# Patient Record
Sex: FEMALE | Race: WHITE | NOT HISPANIC OR LATINO | URBAN - METROPOLITAN AREA
[De-identification: names, ages, dates, MRNs, and addresses within clinical notes are randomized per-mention and may not be internally consistent; named-entity substitution may affect disease eponyms.]

---

## 2021-08-13 ENCOUNTER — INPATIENT (INPATIENT)
Facility: HOSPITAL | Age: 61
LOS: 1 days | Discharge: HOME | End: 2021-08-15
Attending: STUDENT IN AN ORGANIZED HEALTH CARE EDUCATION/TRAINING PROGRAM | Admitting: STUDENT IN AN ORGANIZED HEALTH CARE EDUCATION/TRAINING PROGRAM
Payer: COMMERCIAL

## 2021-08-13 VITALS
WEIGHT: 164.91 LBS | HEART RATE: 80 BPM | TEMPERATURE: 100 F | OXYGEN SATURATION: 99 % | HEIGHT: 61 IN | SYSTOLIC BLOOD PRESSURE: 149 MMHG | DIASTOLIC BLOOD PRESSURE: 83 MMHG | RESPIRATION RATE: 18 BRPM

## 2021-08-13 DIAGNOSIS — Z98.890 OTHER SPECIFIED POSTPROCEDURAL STATES: Chronic | ICD-10-CM

## 2021-08-13 DIAGNOSIS — E89.0 POSTPROCEDURAL HYPOTHYROIDISM: Chronic | ICD-10-CM

## 2021-08-13 DIAGNOSIS — K35.80 UNSPECIFIED ACUTE APPENDICITIS: ICD-10-CM

## 2021-08-13 DIAGNOSIS — K21.9 GASTRO-ESOPHAGEAL REFLUX DISEASE WITHOUT ESOPHAGITIS: ICD-10-CM

## 2021-08-13 LAB
ALBUMIN SERPL ELPH-MCNC: 4.9 G/DL — SIGNIFICANT CHANGE UP (ref 3.5–5.2)
ALP SERPL-CCNC: 146 U/L — HIGH (ref 30–115)
ALT FLD-CCNC: 28 U/L — SIGNIFICANT CHANGE UP (ref 0–41)
ANION GAP SERPL CALC-SCNC: 13 MMOL/L — SIGNIFICANT CHANGE UP (ref 7–14)
APPEARANCE UR: CLEAR — SIGNIFICANT CHANGE UP
APTT BLD: 33.2 SEC — SIGNIFICANT CHANGE UP (ref 27–39.2)
AST SERPL-CCNC: 14 U/L — SIGNIFICANT CHANGE UP (ref 0–41)
BACTERIA # UR AUTO: ABNORMAL
BASOPHILS # BLD AUTO: 0.03 K/UL — SIGNIFICANT CHANGE UP (ref 0–0.2)
BASOPHILS NFR BLD AUTO: 0.3 % — SIGNIFICANT CHANGE UP (ref 0–1)
BILIRUB DIRECT SERPL-MCNC: <0.2 MG/DL — SIGNIFICANT CHANGE UP (ref 0–0.2)
BILIRUB INDIRECT FLD-MCNC: >0.3 MG/DL — SIGNIFICANT CHANGE UP (ref 0.2–1.2)
BILIRUB SERPL-MCNC: 0.5 MG/DL — SIGNIFICANT CHANGE UP (ref 0.2–1.2)
BILIRUB UR-MCNC: NEGATIVE — SIGNIFICANT CHANGE UP
BLD GP AB SCN SERPL QL: SIGNIFICANT CHANGE UP
BUN SERPL-MCNC: 13 MG/DL — SIGNIFICANT CHANGE UP (ref 10–20)
CALCIUM SERPL-MCNC: 10 MG/DL — SIGNIFICANT CHANGE UP (ref 8.5–10.1)
CHLORIDE SERPL-SCNC: 101 MMOL/L — SIGNIFICANT CHANGE UP (ref 98–110)
CO2 SERPL-SCNC: 25 MMOL/L — SIGNIFICANT CHANGE UP (ref 17–32)
COLOR SPEC: YELLOW — SIGNIFICANT CHANGE UP
COMMENT - URINE: SIGNIFICANT CHANGE UP
CREAT SERPL-MCNC: 0.7 MG/DL — SIGNIFICANT CHANGE UP (ref 0.7–1.5)
DIFF PNL FLD: ABNORMAL
EOSINOPHIL # BLD AUTO: 0.03 K/UL — SIGNIFICANT CHANGE UP (ref 0–0.7)
EOSINOPHIL NFR BLD AUTO: 0.3 % — SIGNIFICANT CHANGE UP (ref 0–8)
EPI CELLS # UR: ABNORMAL /HPF
GLUCOSE SERPL-MCNC: 116 MG/DL — HIGH (ref 70–99)
GLUCOSE UR QL: NEGATIVE MG/DL — SIGNIFICANT CHANGE UP
HCT VFR BLD CALC: 39.6 % — SIGNIFICANT CHANGE UP (ref 37–47)
HGB BLD-MCNC: 13.4 G/DL — SIGNIFICANT CHANGE UP (ref 12–16)
IMM GRANULOCYTES NFR BLD AUTO: 0.3 % — SIGNIFICANT CHANGE UP (ref 0.1–0.3)
INR BLD: 1.08 RATIO — SIGNIFICANT CHANGE UP (ref 0.65–1.3)
KETONES UR-MCNC: NEGATIVE — SIGNIFICANT CHANGE UP
LACTATE SERPL-SCNC: 1.7 MMOL/L — SIGNIFICANT CHANGE UP (ref 0.7–2)
LEUKOCYTE ESTERASE UR-ACNC: NEGATIVE — SIGNIFICANT CHANGE UP
LIDOCAIN IGE QN: 35 U/L — SIGNIFICANT CHANGE UP (ref 7–60)
LYMPHOCYTES # BLD AUTO: 1.23 K/UL — SIGNIFICANT CHANGE UP (ref 1.2–3.4)
LYMPHOCYTES # BLD AUTO: 12.1 % — LOW (ref 20.5–51.1)
MCHC RBC-ENTMCNC: 33.1 PG — HIGH (ref 27–31)
MCHC RBC-ENTMCNC: 33.8 G/DL — SIGNIFICANT CHANGE UP (ref 32–37)
MCV RBC AUTO: 97.8 FL — SIGNIFICANT CHANGE UP (ref 81–99)
MONOCYTES # BLD AUTO: 0.5 K/UL — SIGNIFICANT CHANGE UP (ref 0.1–0.6)
MONOCYTES NFR BLD AUTO: 4.9 % — SIGNIFICANT CHANGE UP (ref 1.7–9.3)
NEUTROPHILS # BLD AUTO: 8.33 K/UL — HIGH (ref 1.4–6.5)
NEUTROPHILS NFR BLD AUTO: 82.1 % — HIGH (ref 42.2–75.2)
NITRITE UR-MCNC: NEGATIVE — SIGNIFICANT CHANGE UP
NRBC # BLD: 0 /100 WBCS — SIGNIFICANT CHANGE UP (ref 0–0)
PH UR: 6 — SIGNIFICANT CHANGE UP (ref 5–8)
PLATELET # BLD AUTO: 273 K/UL — SIGNIFICANT CHANGE UP (ref 130–400)
POTASSIUM SERPL-MCNC: 3.9 MMOL/L — SIGNIFICANT CHANGE UP (ref 3.5–5)
POTASSIUM SERPL-SCNC: 3.9 MMOL/L — SIGNIFICANT CHANGE UP (ref 3.5–5)
PROT SERPL-MCNC: 7.2 G/DL — SIGNIFICANT CHANGE UP (ref 6–8)
PROT UR-MCNC: NEGATIVE MG/DL — SIGNIFICANT CHANGE UP
PROTHROM AB SERPL-ACNC: 12.4 SEC — SIGNIFICANT CHANGE UP (ref 9.95–12.87)
RBC # BLD: 4.05 M/UL — LOW (ref 4.2–5.4)
RBC # FLD: 12.5 % — SIGNIFICANT CHANGE UP (ref 11.5–14.5)
RBC CASTS # UR COMP ASSIST: ABNORMAL /HPF
SARS-COV-2 RNA SPEC QL NAA+PROBE: SIGNIFICANT CHANGE UP
SODIUM SERPL-SCNC: 139 MMOL/L — SIGNIFICANT CHANGE UP (ref 135–146)
SP GR SPEC: 1.02 — SIGNIFICANT CHANGE UP (ref 1.01–1.03)
UROBILINOGEN FLD QL: 0.2 MG/DL — SIGNIFICANT CHANGE UP (ref 0.2–0.2)
WBC # BLD: 10.15 K/UL — SIGNIFICANT CHANGE UP (ref 4.8–10.8)
WBC # FLD AUTO: 10.15 K/UL — SIGNIFICANT CHANGE UP (ref 4.8–10.8)
WBC UR QL: SIGNIFICANT CHANGE UP /HPF

## 2021-08-13 PROCEDURE — 99285 EMERGENCY DEPT VISIT HI MDM: CPT

## 2021-08-13 PROCEDURE — 74177 CT ABD & PELVIS W/CONTRAST: CPT | Mod: 26,MA

## 2021-08-13 RX ORDER — CEFOTETAN DISODIUM 1 G
2 VIAL (EA) INJECTION ONCE
Refills: 0 | Status: COMPLETED | OUTPATIENT
Start: 2021-08-13 | End: 2021-08-13

## 2021-08-13 RX ORDER — CEFOTETAN DISODIUM 1 G
1 VIAL (EA) INJECTION EVERY 12 HOURS
Refills: 0 | Status: DISCONTINUED | OUTPATIENT
Start: 2021-08-14 | End: 2021-08-14

## 2021-08-13 RX ORDER — FAMOTIDINE 10 MG/ML
20 INJECTION INTRAVENOUS ONCE
Refills: 0 | Status: COMPLETED | OUTPATIENT
Start: 2021-08-13 | End: 2021-08-13

## 2021-08-13 RX ORDER — SODIUM CHLORIDE 9 MG/ML
1000 INJECTION INTRAMUSCULAR; INTRAVENOUS; SUBCUTANEOUS ONCE
Refills: 0 | Status: COMPLETED | OUTPATIENT
Start: 2021-08-13 | End: 2021-08-13

## 2021-08-13 RX ORDER — CEFOTETAN DISODIUM 1 G
VIAL (EA) INJECTION
Refills: 0 | Status: DISCONTINUED | OUTPATIENT
Start: 2021-08-14 | End: 2021-08-14

## 2021-08-13 RX ORDER — ONDANSETRON 8 MG/1
4 TABLET, FILM COATED ORAL ONCE
Refills: 0 | Status: COMPLETED | OUTPATIENT
Start: 2021-08-13 | End: 2021-08-13

## 2021-08-13 RX ORDER — CEFOTETAN DISODIUM 1 G
1 VIAL (EA) INJECTION ONCE
Refills: 0 | Status: COMPLETED | OUTPATIENT
Start: 2021-08-13 | End: 2021-08-13

## 2021-08-13 RX ADMIN — SODIUM CHLORIDE 1000 MILLILITER(S): 9 INJECTION INTRAMUSCULAR; INTRAVENOUS; SUBCUTANEOUS at 20:36

## 2021-08-13 RX ADMIN — Medication 30 MILLILITER(S): at 20:38

## 2021-08-13 RX ADMIN — ONDANSETRON 4 MILLIGRAM(S): 8 TABLET, FILM COATED ORAL at 20:38

## 2021-08-13 RX ADMIN — Medication 100 GRAM(S): at 23:09

## 2021-08-13 RX ADMIN — FAMOTIDINE 20 MILLIGRAM(S): 10 INJECTION INTRAVENOUS at 20:38

## 2021-08-13 NOTE — ED ADULT TRIAGE NOTE - CHIEF COMPLAINT QUOTE
pt states entire abdominal pain and pain was worse when palpated at urgent care in RLQ of abdomen, pain began at 3am

## 2021-08-13 NOTE — ED PROVIDER NOTE - OBJECTIVE STATEMENT
61 year old F with hx of htn, hld,  breast ca s/p rt lumpectomy x 10 years ago, thyroid ca s/p thyroidectomy in 2016 c/o abd pain. Sts since 3 am has had generalized burning abd pain with assoc nausea, decreased appetite. No fever/chills, chest pain, sob, diarrhea, urinary symptoms, recent travel, sick contact, abd surgeries, tobacco/alcohol use.

## 2021-08-13 NOTE — H&P ADULT - ATTENDING COMMENTS
62yo woman who p/w abdominal pain for a few days, now localized to the RLQ. There is tenderness in the RLQ and low grade temp to 100.2. Otherwise, pt HD stable. WBC ct notably normal. CT with acute appendecitis with fecalith. Plan for adfmission, IV abx, lap appendectomy. R/B/A were discussed with the pt and she provided informed consent.

## 2021-08-13 NOTE — ED PROVIDER NOTE - PHYSICAL EXAMINATION
CONSTITUTIONAL: Well-appearing; well-nourished; in no apparent distress.   EYES: PERRL; EOM intact.   ENT: normal nose; no rhinorrhea; normal pharynx with no tonsillar hypertrophy.   NECK: Supple; non-tender; no cervical lymphadenopathy.   CARDIOVASCULAR: Normal S1, S2; no murmurs, rubs, or gallops.   RESPIRATORY: Normal chest excursion with respiration; breath sounds clear and equal bilaterally; no wheezes, rhonchi, or rales.  GI/: Normal bowel sounds; non-distended; non-tender; no palpable organomegaly. no cva ttp. Neg murphs sign. No ttp at m[place] point.   MS: No evidence of trauma or deformity. Normal ROM in all four extremities; non-tender to palpation; distal pulses are normal.   SKIN: Normal for age and race; warm; dry; good turgor; no apparent lesions or exudate.   NEURO/PSYCH: A & O x 4; grossly unremarkable. mood and manner are appropriate.

## 2021-08-13 NOTE — H&P ADULT - NSICDXPASTMEDICALHX_GEN_ALL_CORE_FT
PAST MEDICAL HISTORY:  Cancer of breast 10 yr  ago      History of thyroid cancer     HLD (hyperlipidemia)     HTN (hypertension)

## 2021-08-13 NOTE — ED PROVIDER NOTE - NS ED ROS FT
Constitutional: no fever, chills, no recent weight loss, change in appetite or malaise  Eyes: no redness/discharge/pain/vision changes  ENT: no rhinorrhea/ear pain/sore throat  Cardiac: No chest pain, SOB or edema.  Respiratory: No cough or respiratory distress  GI: + abd pain/nausea  : No dysuria, frequency, urgency or hematuria  MS: no pain to back or extremities, no loss of ROM, no weakness  Neuro: No headache or weakness. No LOC.  Skin: No skin rash.  Endocrine: No history of thyroid disease or diabetes.  Except as documented in the HPI, all other systems are negative.

## 2021-08-13 NOTE — H&P ADULT - HISTORY OF PRESENT ILLNESS
Chief Complaint: abdominal pain.    · Chief Complaint: The patient is a 61y Female complaining of abdominal pain.  · HPI Objective Statement: 61 year old F with hx of htn, hld,  breast ca s/p rt lumpectomy/radiation  x 10 years ago, thyroid ca s/p thyroidectomy in 2016 c/o abd pain. Sts since 3 am has had generalized burning abd pain with assoc nausea, decreased appetite. No fever/chills, chest pain, sob, diarrhea, urinary symptoms, recent travel, sick contact, abd surgeries, tobacco/alcohol use.    HIV:    HIV Test Questions:  · In accordance with [place] State law, we offer every patient who comes to our ED an HIV test. Would you like to be tested today?	Opt out    PAST MEDICAL/SURGICAL/FAMILY/SOCIAL HISTORY:    Tobacco Usage:  · Tobacco Usage	Never smoker    ALLERGIES AND HOME MEDICATIONS:   Allergies:        Allergies:  	sulfa drugs: Drug Category, Rash    Home Medications:   * Outpatient Medication Status not yet specified

## 2021-08-13 NOTE — H&P ADULT - NSHPLABSRESULTS_GEN_ALL_CORE
EXAM:  CT ABDOMEN AND PELVIS IC            PROCEDURE DATE:  08/13/2021            INTERPRETATION:  CLINICAL HISTORY: Abdominal pain.    TECHNIQUE: Contiguous axial CT images were obtained from the lower chest to the pubic symphysis following administration of intravenous contrast. Oral contrast was not administered. Reformatted images in the coronal and sagittal planes were acquired.    COMPARISON: None.      FINDINGS:    LOWER CHEST: Unremarkable.    HEPATOBILIARY: Hepatic cysts. 1.2 cm focal enhancement in the right lobe of the liver (4/113)    SPLEEN: Unremarkable.    PANCREAS: Unremarkable.    ADRENAL GLANDS: Unremarkable.    KIDNEYS: No hydronephrosis bilaterally. Symmetric nephrograms. 4mm nonobstructing right renal calculus    ABDOMINOPELVIC NODES: No lymphadenopathy.    PELVIC ORGANS: Fibroid uterus. If clinically warranted, nonemergent pelvic ultrasound may be of benefit.    PERITONEUM/MESENTERY/BOWEL: Dilated appendix and proximal, tube-shaped appendicolith measuring up to 3.0cm in length. Adjacent mild inflammatory stranding. No abscess or abdominopelvic free fluid or gas.  No evidence of bowel obstruction.    BONES/SOFT TISSUES: No acute osseous abnormality. Lumbar discopathy predominantly involving L4-L5 disc space.. Densely sclerotic stellate-shaped lesion in the left ischial tuberosity measures up to 1.5 cm and is compatible with a bone island.    OTHER: Calcific atherosclerosis.      IMPRESSION:      Acute appendicitis with a proximal, tibial appendicolith measuring up to 3.0 cm in length. No abscess or free fluid or air.    --- End of Report ---            LARA JUAN MD; Resident Radiologist  This document has been electronically signed.  GOPAL STEINBEGR MD; Attending Radiologist  This document has been electronically signed. Aug 13 2021 10:40PM

## 2021-08-13 NOTE — H&P ADULT - ASSESSMENT
appendicitis    npo  ivf  ns  @ 125  iv abx  cefotetan  pain management   morphine prn  zofran   pepcid iv  dvt/ gi prophylaxis  scds

## 2021-08-14 ENCOUNTER — RESULT REVIEW (OUTPATIENT)
Age: 61
End: 2021-08-14

## 2021-08-14 ENCOUNTER — TRANSCRIPTION ENCOUNTER (OUTPATIENT)
Age: 61
End: 2021-08-14

## 2021-08-14 LAB
ANION GAP SERPL CALC-SCNC: 12 MMOL/L — SIGNIFICANT CHANGE UP (ref 7–14)
BUN SERPL-MCNC: 10 MG/DL — SIGNIFICANT CHANGE UP (ref 10–20)
CALCIUM SERPL-MCNC: 9.2 MG/DL — SIGNIFICANT CHANGE UP (ref 8.5–10.1)
CHLORIDE SERPL-SCNC: 106 MMOL/L — SIGNIFICANT CHANGE UP (ref 98–110)
CO2 SERPL-SCNC: 23 MMOL/L — SIGNIFICANT CHANGE UP (ref 17–32)
COVID-19 SPIKE DOMAIN AB INTERP: POSITIVE
COVID-19 SPIKE DOMAIN ANTIBODY RESULT: 197 U/ML — HIGH
CREAT SERPL-MCNC: 0.8 MG/DL — SIGNIFICANT CHANGE UP (ref 0.7–1.5)
GLUCOSE SERPL-MCNC: 98 MG/DL — SIGNIFICANT CHANGE UP (ref 70–99)
HCT VFR BLD CALC: 36.4 % — LOW (ref 37–47)
HCV AB S/CO SERPL IA: 0.03 COI — SIGNIFICANT CHANGE UP
HCV AB SERPL-IMP: SIGNIFICANT CHANGE UP
HGB BLD-MCNC: 12.3 G/DL — SIGNIFICANT CHANGE UP (ref 12–16)
MCHC RBC-ENTMCNC: 33.4 PG — HIGH (ref 27–31)
MCHC RBC-ENTMCNC: 33.8 G/DL — SIGNIFICANT CHANGE UP (ref 32–37)
MCV RBC AUTO: 98.9 FL — SIGNIFICANT CHANGE UP (ref 81–99)
NRBC # BLD: 0 /100 WBCS — SIGNIFICANT CHANGE UP (ref 0–0)
PLATELET # BLD AUTO: 255 K/UL — SIGNIFICANT CHANGE UP (ref 130–400)
POTASSIUM SERPL-MCNC: 3.9 MMOL/L — SIGNIFICANT CHANGE UP (ref 3.5–5)
POTASSIUM SERPL-SCNC: 3.9 MMOL/L — SIGNIFICANT CHANGE UP (ref 3.5–5)
RBC # BLD: 3.68 M/UL — LOW (ref 4.2–5.4)
RBC # FLD: 12.7 % — SIGNIFICANT CHANGE UP (ref 11.5–14.5)
SARS-COV-2 IGG+IGM SERPL QL IA: 197 U/ML — HIGH
SARS-COV-2 IGG+IGM SERPL QL IA: POSITIVE
SODIUM SERPL-SCNC: 141 MMOL/L — SIGNIFICANT CHANGE UP (ref 135–146)
WBC # BLD: 8.33 K/UL — SIGNIFICANT CHANGE UP (ref 4.8–10.8)
WBC # FLD AUTO: 8.33 K/UL — SIGNIFICANT CHANGE UP (ref 4.8–10.8)

## 2021-08-14 PROCEDURE — 93010 ELECTROCARDIOGRAM REPORT: CPT

## 2021-08-14 PROCEDURE — 99223 1ST HOSP IP/OBS HIGH 75: CPT | Mod: 57

## 2021-08-14 PROCEDURE — 88304 TISSUE EXAM BY PATHOLOGIST: CPT | Mod: 26

## 2021-08-14 PROCEDURE — 44970 LAPAROSCOPY APPENDECTOMY: CPT

## 2021-08-14 RX ORDER — OXYCODONE HYDROCHLORIDE 5 MG/1
5 TABLET ORAL EVERY 6 HOURS
Refills: 0 | Status: DISCONTINUED | OUTPATIENT
Start: 2021-08-14 | End: 2021-08-15

## 2021-08-14 RX ORDER — LEVOTHYROXINE SODIUM 125 MCG
1 TABLET ORAL
Qty: 0 | Refills: 0 | DISCHARGE

## 2021-08-14 RX ORDER — IBUPROFEN 200 MG
400 TABLET ORAL EVERY 6 HOURS
Refills: 0 | Status: DISCONTINUED | OUTPATIENT
Start: 2021-08-14 | End: 2021-08-15

## 2021-08-14 RX ORDER — HYDROMORPHONE HYDROCHLORIDE 2 MG/ML
1 INJECTION INTRAMUSCULAR; INTRAVENOUS; SUBCUTANEOUS
Refills: 0 | Status: DISCONTINUED | OUTPATIENT
Start: 2021-08-14 | End: 2021-08-14

## 2021-08-14 RX ORDER — LEVOTHYROXINE SODIUM 125 MCG
100 TABLET ORAL DAILY
Refills: 0 | Status: DISCONTINUED | OUTPATIENT
Start: 2021-08-15 | End: 2021-08-15

## 2021-08-14 RX ORDER — EXEMESTANE 25 MG/1
1 TABLET, SUGAR COATED ORAL
Qty: 0 | Refills: 0 | DISCHARGE

## 2021-08-14 RX ORDER — HYDROCHLOROTHIAZIDE 25 MG
12.5 TABLET ORAL DAILY
Refills: 0 | Status: DISCONTINUED | OUTPATIENT
Start: 2021-08-15 | End: 2021-08-15

## 2021-08-14 RX ORDER — ATORVASTATIN CALCIUM 80 MG/1
20 TABLET, FILM COATED ORAL AT BEDTIME
Refills: 0 | Status: DISCONTINUED | OUTPATIENT
Start: 2021-08-14 | End: 2021-08-15

## 2021-08-14 RX ORDER — LOSARTAN/HYDROCHLOROTHIAZIDE 100MG-25MG
1 TABLET ORAL
Qty: 0 | Refills: 0 | DISCHARGE

## 2021-08-14 RX ORDER — SODIUM CHLORIDE 9 MG/ML
1000 INJECTION, SOLUTION INTRAVENOUS
Refills: 0 | Status: DISCONTINUED | OUTPATIENT
Start: 2021-08-14 | End: 2021-08-14

## 2021-08-14 RX ORDER — IBUPROFEN 200 MG
1 TABLET ORAL
Qty: 0 | Refills: 0 | DISCHARGE
Start: 2021-08-14

## 2021-08-14 RX ORDER — FAMOTIDINE 10 MG/ML
20 INJECTION INTRAVENOUS EVERY 12 HOURS
Refills: 0 | Status: DISCONTINUED | OUTPATIENT
Start: 2021-08-14 | End: 2021-08-14

## 2021-08-14 RX ORDER — ONDANSETRON 8 MG/1
4 TABLET, FILM COATED ORAL EVERY 6 HOURS
Refills: 0 | Status: DISCONTINUED | OUTPATIENT
Start: 2021-08-13 | End: 2021-08-14

## 2021-08-14 RX ORDER — SODIUM CHLORIDE 9 MG/ML
1000 INJECTION INTRAMUSCULAR; INTRAVENOUS; SUBCUTANEOUS
Refills: 0 | Status: DISCONTINUED | OUTPATIENT
Start: 2021-08-13 | End: 2021-08-14

## 2021-08-14 RX ORDER — MORPHINE SULFATE 50 MG/1
2 CAPSULE, EXTENDED RELEASE ORAL EVERY 4 HOURS
Refills: 0 | Status: DISCONTINUED | OUTPATIENT
Start: 2021-08-13 | End: 2021-08-14

## 2021-08-14 RX ORDER — CEFOTETAN DISODIUM 1 G
1 VIAL (EA) INJECTION EVERY 12 HOURS
Refills: 0 | Status: DISCONTINUED | OUTPATIENT
Start: 2021-08-14 | End: 2021-08-15

## 2021-08-14 RX ORDER — MORPHINE SULFATE 50 MG/1
4 CAPSULE, EXTENDED RELEASE ORAL EVERY 6 HOURS
Refills: 0 | Status: DISCONTINUED | OUTPATIENT
Start: 2021-08-13 | End: 2021-08-14

## 2021-08-14 RX ORDER — HYDROMORPHONE HYDROCHLORIDE 2 MG/ML
0.5 INJECTION INTRAMUSCULAR; INTRAVENOUS; SUBCUTANEOUS
Refills: 0 | Status: DISCONTINUED | OUTPATIENT
Start: 2021-08-14 | End: 2021-08-14

## 2021-08-14 RX ORDER — METOPROLOL TARTRATE 50 MG
25 TABLET ORAL DAILY
Refills: 0 | Status: DISCONTINUED | OUTPATIENT
Start: 2021-08-15 | End: 2021-08-15

## 2021-08-14 RX ORDER — OMEPRAZOLE 10 MG/1
1 CAPSULE, DELAYED RELEASE ORAL
Qty: 0 | Refills: 0 | DISCHARGE

## 2021-08-14 RX ORDER — CEFOTETAN DISODIUM 1 G
1 VIAL (EA) INJECTION ONCE
Refills: 0 | Status: COMPLETED | OUTPATIENT
Start: 2021-08-14 | End: 2021-08-14

## 2021-08-14 RX ORDER — CEFOTETAN DISODIUM 1 G
1 VIAL (EA) INJECTION EVERY 12 HOURS
Refills: 0 | Status: DISCONTINUED | OUTPATIENT
Start: 2021-08-14 | End: 2021-08-14

## 2021-08-14 RX ORDER — LOSARTAN POTASSIUM 100 MG/1
100 TABLET, FILM COATED ORAL DAILY
Refills: 0 | Status: DISCONTINUED | OUTPATIENT
Start: 2021-08-15 | End: 2021-08-15

## 2021-08-14 RX ORDER — ACETAMINOPHEN 500 MG
2 TABLET ORAL
Qty: 0 | Refills: 0 | DISCHARGE
Start: 2021-08-14

## 2021-08-14 RX ORDER — PANTOPRAZOLE SODIUM 20 MG/1
40 TABLET, DELAYED RELEASE ORAL
Refills: 0 | Status: DISCONTINUED | OUTPATIENT
Start: 2021-08-14 | End: 2021-08-15

## 2021-08-14 RX ORDER — HEPARIN SODIUM 5000 [USP'U]/ML
5000 INJECTION INTRAVENOUS; SUBCUTANEOUS EVERY 8 HOURS
Refills: 0 | Status: DISCONTINUED | OUTPATIENT
Start: 2021-08-14 | End: 2021-08-15

## 2021-08-14 RX ORDER — ATORVASTATIN CALCIUM 80 MG/1
1 TABLET, FILM COATED ORAL
Qty: 0 | Refills: 0 | DISCHARGE

## 2021-08-14 RX ORDER — CEFOTETAN DISODIUM 1 G
VIAL (EA) INJECTION
Refills: 0 | Status: DISCONTINUED | OUTPATIENT
Start: 2021-08-14 | End: 2021-08-14

## 2021-08-14 RX ORDER — ACETAMINOPHEN 500 MG
650 TABLET ORAL EVERY 6 HOURS
Refills: 0 | Status: DISCONTINUED | OUTPATIENT
Start: 2021-08-14 | End: 2021-08-15

## 2021-08-14 RX ORDER — METOPROLOL TARTRATE 50 MG
1 TABLET ORAL
Qty: 0 | Refills: 0 | DISCHARGE

## 2021-08-14 RX ADMIN — MORPHINE SULFATE 4 MILLIGRAM(S): 50 CAPSULE, EXTENDED RELEASE ORAL at 01:38

## 2021-08-14 RX ADMIN — HEPARIN SODIUM 5000 UNIT(S): 5000 INJECTION INTRAVENOUS; SUBCUTANEOUS at 21:39

## 2021-08-14 RX ADMIN — SODIUM CHLORIDE 150 MILLILITER(S): 9 INJECTION, SOLUTION INTRAVENOUS at 14:19

## 2021-08-14 RX ADMIN — Medication 12.5 MILLIGRAM(S): at 21:39

## 2021-08-14 RX ADMIN — Medication 100 GRAM(S): at 06:16

## 2021-08-14 RX ADMIN — Medication 25 MILLIGRAM(S): at 21:39

## 2021-08-14 RX ADMIN — LOSARTAN POTASSIUM 100 MILLIGRAM(S): 100 TABLET, FILM COATED ORAL at 21:39

## 2021-08-14 RX ADMIN — Medication 100 GRAM(S): at 21:38

## 2021-08-14 RX ADMIN — ATORVASTATIN CALCIUM 20 MILLIGRAM(S): 80 TABLET, FILM COATED ORAL at 21:39

## 2021-08-14 RX ADMIN — FAMOTIDINE 20 MILLIGRAM(S): 10 INJECTION INTRAVENOUS at 06:16

## 2021-08-14 NOTE — PROGRESS NOTE ADULT - SUBJECTIVE AND OBJECTIVE BOX
POST OP NOTE    S; Pt doing well; c/o abd soreness, and occasional burping. Has not voided yet, no Fl/BM. Tolerating clears/crackers.    O; Vital Signs Last 24 Hrs  T(C): 35.8 (14 Aug 2021 15:18), Max: 37.9 (14 Aug 2021 00:30)  T(F): 96.5 (14 Aug 2021 15:18), Max: 100.2 (14 Aug 2021 00:30)  HR: 66 (14 Aug 2021 15:18) (65 - 80)  BP: 116/66 (14 Aug 2021 15:18) (116/66 - 149/83)  BP(mean): --  RR: 18 (14 Aug 2021 15:18) (16 - 18)  SpO2: 100% (14 Aug 2021 14:50) (96% - 100%)    MEDICATIONS  (STANDING):  lactated ringers. 1000 milliLiter(s) (150 mL/Hr) IV Continuous <Continuous>    MEDICATIONS  (PRN):  acetaminophen   Tablet .. 650 milliGRAM(s) Oral every 6 hours PRN Mild Pain (1 - 3)  ibuprofen  Tablet. 400 milliGRAM(s) Oral every 6 hours PRN Moderate Pain (4 - 6)  oxyCODONE    IR 5 milliGRAM(s) Oral every 6 hours PRN Severe Pain (7 - 10)    EXAM:  gen: in NAD  lungs: cta  cvs: s1s2  abd: soft, mildly distended and tender.  Incisions c/d/i with dermabond  ext; no LE edema appreciated

## 2021-08-14 NOTE — DISCHARGE NOTE PROVIDER - HOSPITAL COURSE
61 year old F with hx of htn, hld,  breast ca s/p rt lumpectomy/radiation  x 10 years ago, thyroid ca s/p thyroidectomy in 2016 c/o abd pain. Sts since 3 am has had generalized burning abd pain with assoc nausea, decreased appetite - diagnosed with acute appendicitis, all labs WNL, and low grade temps    Admitted to surgical service, started on cefotetan    s/p lap appendectomy 8/14/21 - she remained afebrile, voided, tolerated diet and was D/c'd home same day on augmentin x 3 days 61 year old F with hx of htn, hld,  breast ca s/p rt lumpectomy/radiation  x 10 years ago, thyroid ca s/p thyroidectomy in 2016 c/o abd pain. Sts since 3 am has had generalized burning abd pain with assoc nausea, decreased appetite - diagnosed with acute appendicitis, all labs WNL, and low grade temps    Admitted to surgical service, started on cefotetan    s/p lap appendectomy 8/14/21 - she remained afebrile, voided, tolerated diet and was D/c'd home 8/15 on augmentin x 3 days

## 2021-08-14 NOTE — CHART NOTE - NSCHARTNOTEFT_GEN_A_CORE
PACU ANESTHESIA ADMISSION NOTE      Procedure:   Post op diagnosis:      ____  Intubated  TV:______       Rate: ______      FiO2: ______    _x___  Patent Airway    _x___  Full return of protective reflexes    _x___  Full recovery from anesthesia / back to baseline status    Vitals:    /65  P 80  R 14  T 97.7  Sat 97    Mental Status:  _x___ Awake   _____ Alert   _____ Drowsy   _____ Sedated    Nausea/Vomiting:  _x___  NO       ______Yes,   See Post - Op Orders         Pain Scale (0-10):  __0___    Treatment: _x___ None    ____ See Post - Op/PCA Orders    Post - Operative Fluids:   __x__ Oral   _x___ See Post - Op Orders    Plan: Discharge:   _x___Home       x_____Floor     _____Critical Care    _____  Other:_________________    Comments:  No anesthesia issues or complications noted.  Discharge when criteria met.

## 2021-08-14 NOTE — PROGRESS NOTE ADULT - SUBJECTIVE AND OBJECTIVE BOX
S:  Pt still w/some lower abdominal pain (R>L), relieved with morphine.  No N/V/dysuria. Had low grade temp (100.2)  O: Vital Signs Last 24 Hrs  T(C): 37.7 (14 Aug 2021 05:00), Max: 37.9 (14 Aug 2021 00:30)  T(F): 99.8 (14 Aug 2021 05:00), Max: 100.2 (14 Aug 2021 00:30)  HR: 77 (14 Aug 2021 05:00) (77 - 80)  BP: 127/68 (14 Aug 2021 05:00) (127/68 - 149/83)  BP(mean): --  RR: 16 (14 Aug 2021 05:00) (16 - 18)  SpO2: 99% (13 Aug 2021 20:05) (99% - 99%)    MEDICATIONS  (STANDING):  cefoTEtan  IVPB      cefoTEtan  IVPB 1 Gram(s) IV Intermittent every 12 hours  famotidine Injectable 20 milliGRAM(s) IV Push every 12 hours  sodium chloride 0.9%. 1000 milliLiter(s) (125 mL/Hr) IV Continuous <Continuous>    MEDICATIONS  (PRN):  morphine  - Injectable 2 milliGRAM(s) IV Push every 4 hours PRN Moderate Pain (4 - 6)  morphine  - Injectable 4 milliGRAM(s) IV Push every 6 hours PRN Severe Pain (7 - 10)  ondansetron Injectable 4 milliGRAM(s) IV Push every 6 hours PRN Nausea and/or Vomiting      EXAM:  lungs: cta  cvs: s1s2  abd: soft, ND, +mild lower abdominal tenderness (R>L), no rebound/guarding    LABS: PENDING

## 2021-08-14 NOTE — PROGRESS NOTE ADULT - ASSESSMENT
61 year old F with hx of htn, hld,  breast ca s/p rt lumpectomy/radiation  x 10 years ago, thyroid ca s/p thyroidectomy in 2016 c/o abd pain      1. Acute appendicitis w/appendicolith    Pt for OR otday   - Keep NPO, IV fluids   - Pain management

## 2021-08-14 NOTE — PROGRESS NOTE ADULT - ASSESSMENT
POD# 0 s/p lap appendectomy    Case D/W Dr. Aguila earlier:   - Once patient voids/tolerates diet - may be D/C;'d home tonight on augmentin x 3 days and F/U office in 2 weeks   POD# 0 s/p lap appendectomy    Case D/W Dr. Aguila:   1. acute appendicitis  - will monitor overnight and await bowel function   - continue cefotetan  - tylenol, ibuprofen for pain    2. HTN   - continue losartan/HCTZ, metoprolol    3. Hypothyroid s/p thyroidectomy   - continue synthroid    4.  hx breast cancer  - continue exemestane upon D/C    5.  GI/DVT prophylaxis   - protonix   - SQ heparin   - Once patient voids/tolerates diet - may be D/C;'d home tonight on augmentin x 3 days and F/U office in 2 weeks

## 2021-08-14 NOTE — DISCHARGE NOTE PROVIDER - CARE PROVIDER_API CALL
Master Aguila  [address] [place] [id number]  Phone: [phone]  Fax: [phone]  Phone: (   )    -  Fax: (   )    -  Follow Up Time: 2 weeks

## 2021-08-14 NOTE — DISCHARGE NOTE PROVIDER - PROVIDER TOKENS
FREE:[LAST:[Ayla],FIRST:[Master],PHONE:[(   )    -],FAX:[(   )    -],ADDRESS:[[address] [place] [address]  Phone: [phone]  Fax: [phone]],FOLLOWUP:[2 weeks]]

## 2021-08-14 NOTE — DISCHARGE NOTE PROVIDER - NSDCMRMEDTOKEN_GEN_ALL_CORE_FT
acetaminophen 325 mg oral tablet: 2 tab(s) orally every 6 hours, As needed, Mild Pain (1 - 3)  atorvastatin 20 mg oral tablet: 1 tab(s) orally once a day (at bedtime)  Augmentin 875 mg-125 mg oral tablet: 1 tab(s) orally every 12 hours   exemestane 25 mg oral tablet: 1 tab(s) orally once a day  ibuprofen 400 mg oral tablet: 1 tab(s) orally every 6 hours, As needed, Moderate Pain (4 - 6)  losartan-hydrochlorothiazide 100mg-12.5mg oral tablet: 1 tab(s) orally once a day (at bedtime)  Metoprolol Succinate ER 25 mg oral tablet, extended release: 1 tab(s) orally once a day  omeprazole 20 mg oral delayed release capsule: 1 cap(s) orally once a day, As Needed  Synthroid 100 mcg (0.1 mg) oral tablet: 1 tab(s) orally once a day

## 2021-08-14 NOTE — DISCHARGE NOTE PROVIDER - NSDCFUADDINST_GEN_ALL_CORE_FT
If you expeience increased abdominal pain, fevers (100.4 or greater), nausea/vomiting - call Dr. Aguila's office or return to ER    Take antibiotics as directed    May alternate tylenol and ibuprofen for pain    Please follow up with Dr. Aguila in 2 weeks If you experience increased abdominal pain, fevers (100.4 or greater), nausea/vomiting or diarrhea - call Dr. Aguila's office or return to ER    Take antibiotics as directed    May alternate tylenol and ibuprofen for pain    Please follow up with Dr. Aguila in 2 weeks

## 2021-08-15 ENCOUNTER — TRANSCRIPTION ENCOUNTER (OUTPATIENT)
Age: 61
End: 2021-08-15

## 2021-08-15 VITALS
RESPIRATION RATE: 18 BRPM | TEMPERATURE: 97 F | DIASTOLIC BLOOD PRESSURE: 74 MMHG | HEART RATE: 69 BPM | SYSTOLIC BLOOD PRESSURE: 134 MMHG

## 2021-08-15 PROCEDURE — 99024 POSTOP FOLLOW-UP VISIT: CPT

## 2021-08-15 RX ADMIN — Medication 100 GRAM(S): at 05:30

## 2021-08-15 RX ADMIN — Medication 650 MILLIGRAM(S): at 09:45

## 2021-08-15 RX ADMIN — Medication 650 MILLIGRAM(S): at 09:12

## 2021-08-15 RX ADMIN — Medication 100 MICROGRAM(S): at 05:30

## 2021-08-15 RX ADMIN — PANTOPRAZOLE SODIUM 40 MILLIGRAM(S): 20 TABLET, DELAYED RELEASE ORAL at 05:30

## 2021-08-15 NOTE — PROGRESS NOTE ADULT - ASSESSMENT
POD #1 s/p lap appendectomy    Pt seen with Dr. Morales:   - D/C home today w/augmentin   - F/U Dr. Aguila in 2 weeks

## 2021-08-15 NOTE — PROGRESS NOTE ADULT - ATTENDING COMMENTS
Pt. seen and examined. Pt feels well.  Passing flatus, tolerating diet, voiding freely.    AVSS  A&OX3, NAD  Abd soft, NT, ND, incisions C/D/I  Ext warm    s/p laparoscopic appendectomy POD#1  D/C home on Augmentin as per Dr. Aguila  F/u with Dr. Aguila in the office in 1-2 weeks

## 2021-08-15 NOTE — DISCHARGE NOTE NURSING/CASE MANAGEMENT/SOCIAL WORK - PATIENT PORTAL LINK FT
You can access the FollowMyHealth Patient Portal offered by [facility] by registering at the following website: [email or web address]. By joining [email or web address]’s FollowMyHealth portal, you will also be able to view your health information using other applications (apps) compatible with our system.

## 2021-08-15 NOTE — PROGRESS NOTE ADULT - SUBJECTIVE AND OBJECTIVE BOX
S:  Pt doing well - tolerated small amount of breakfast, voiding well, feeling some upper abdominal crampy sensations; +minimal flatus this Am, no BM yet. Denies N/V. Afebrile  O: Vital Signs Last 24 Hrs  T(C): 36.3 (15 Aug 2021 05:00), Max: 36.8 (14 Aug 2021 21:36)  T(F): 97.3 (15 Aug 2021 05:00), Max: 98.3 (14 Aug 2021 21:36)  HR: 69 (15 Aug 2021 05:00) (65 - 82)  BP: 134/74 (15 Aug 2021 05:00) (116/66 - 134/74)  BP(mean): --  RR: 18 (15 Aug 2021 05:00) (16 - 18)  SpO2: 100% (14 Aug 2021 14:50) (96% - 100%)    MEDICATIONS  (STANDING):  atorvastatin 20 milliGRAM(s) Oral at bedtime  cefoTEtan  IVPB 1 Gram(s) IV Intermittent every 12 hours  heparin   Injectable 5000 Unit(s) SubCutaneous every 8 hours  hydrochlorothiazide 12.5 milliGRAM(s) Oral daily  levothyroxine 100 MICROGram(s) Oral daily  losartan 100 milliGRAM(s) Oral daily  metoprolol succinate ER 25 milliGRAM(s) Oral daily  pantoprazole    Tablet 40 milliGRAM(s) Oral before breakfast    MEDICATIONS  (PRN):  acetaminophen   Tablet .. 650 milliGRAM(s) Oral every 6 hours PRN Mild Pain (1 - 3)  ibuprofen  Tablet. 400 milliGRAM(s) Oral every 6 hours PRN Moderate Pain (4 - 6)  oxyCODONE    IR 5 milliGRAM(s) Oral every 6 hours PRN Severe Pain (7 - 10)      EXAM:  lungs: cta  cvs: s1s2  abd: soft, mildly distended and mild upper abdominal tenderness, +BS    Labs: no new results today

## 2021-08-16 PROBLEM — I10 ESSENTIAL (PRIMARY) HYPERTENSION: Chronic | Status: ACTIVE | Noted: 2021-08-13

## 2021-08-16 PROBLEM — E78.5 HYPERLIPIDEMIA, UNSPECIFIED: Chronic | Status: ACTIVE | Noted: 2021-08-13

## 2021-08-16 PROBLEM — C50.919 MALIGNANT NEOPLASM OF UNSPECIFIED SITE OF UNSPECIFIED FEMALE BREAST: Chronic | Status: ACTIVE | Noted: 2021-08-13

## 2021-08-16 PROBLEM — Z85.850 PERSONAL HISTORY OF MALIGNANT NEOPLASM OF THYROID: Chronic | Status: ACTIVE | Noted: 2021-08-13

## 2021-08-17 PROBLEM — Z00.00 ENCOUNTER FOR PREVENTIVE HEALTH EXAMINATION: Status: ACTIVE | Noted: 2021-08-17

## 2021-08-18 LAB — SURGICAL PATHOLOGY STUDY: SIGNIFICANT CHANGE UP

## 2021-08-20 ENCOUNTER — APPOINTMENT (OUTPATIENT)
Age: 61
End: 2021-08-20

## 2021-08-20 VITALS
BODY MASS INDEX: 32.39 KG/M2 | OXYGEN SATURATION: 98 % | DIASTOLIC BLOOD PRESSURE: 84 MMHG | HEART RATE: 67 BPM | SYSTOLIC BLOOD PRESSURE: 123 MMHG | WEIGHT: 165 LBS | TEMPERATURE: 97.5 F | HEIGHT: 60 IN

## 2021-08-24 DIAGNOSIS — I10 ESSENTIAL (PRIMARY) HYPERTENSION: ICD-10-CM

## 2021-08-24 DIAGNOSIS — K35.80 UNSPECIFIED ACUTE APPENDICITIS: ICD-10-CM

## 2021-08-24 DIAGNOSIS — Z20.822 CONTACT WITH AND (SUSPECTED) EXPOSURE TO COVID-19: ICD-10-CM

## 2021-08-24 DIAGNOSIS — Z92.3 PERSONAL HISTORY OF IRRADIATION: ICD-10-CM

## 2021-08-24 DIAGNOSIS — E78.5 HYPERLIPIDEMIA, UNSPECIFIED: ICD-10-CM

## 2021-08-24 DIAGNOSIS — Z85.850 PERSONAL HISTORY OF MALIGNANT NEOPLASM OF THYROID: ICD-10-CM

## 2021-08-24 DIAGNOSIS — Z85.3 PERSONAL HISTORY OF MALIGNANT NEOPLASM OF BREAST: ICD-10-CM

## 2021-08-24 NOTE — PHYSICAL EXAM
[JVD] : no jugular venous distention  [Normal Breath Sounds] : Normal breath sounds [Normal Heart Sounds] : normal heart sounds [Normal Rate and Rhythm] : normal rate and rhythm [Depressed] : depressed [de-identified] : Well appearing; in NAD [de-identified] : NCAT [de-identified] : Supple [de-identified] : CTAB [de-identified] : NA [de-identified] : Soft; obese. Nondistended. Wounds all healing appropriately with intact dermabond. No e/o infection, dehiscence, or hernia. [de-identified] : NA [de-identified] : NA [de-identified] : FROM [de-identified] : Warm; dry [de-identified] : Normal mood and affect [de-identified] : CN 2-12 intact

## 2021-08-24 NOTE — HISTORY OF PRESENT ILLNESS
[de-identified] : This is a 60yo woman with HTN, HLD, breast ca s/p lumpectomy who underwent uncomplicated lap appendectomy on 8/14/2021. She was discharged home on POD1 with brief course PO abx, in setting of gangrene located at base of the appendix, and presents today for followup. [de-identified] : Patient overall has been doing well. She denies nausea or emesis with diet; she began to experience diarrhea shortly after discharge, in the absence of abdominal pain or fever. I instructed her at that time to stop the PO abx. Since that time, the diarrhea has resolved. The pt denies problems with the wounds.\par \par Surgical Final Report \par Final Diagnosis \par Appendix, appendectomy: \par - Acute appendicitis and periappendicitis \par - Fibrous obliteration of the tip \par

## 2021-08-24 NOTE — REASON FOR VISIT
[Post Op: _________] : a [unfilled] post op visit [FreeTextEntry1] : Pt here for post op surgery done on 8/14/21

## 2021-08-24 NOTE — PLAN
[FreeTextEntry1] : - No lifting > 15lb for 6 weeks postoperatively to minimize likelihood of incisional hernia development\par - Patient may return to office as needed

## 2021-08-24 NOTE — ASSESSMENT
[FreeTextEntry1] : 60yo woman with a history of acute appendicitis, s/p lap appendectomy. Doing well.
